# Patient Record
Sex: MALE | Race: ASIAN | NOT HISPANIC OR LATINO | Employment: FULL TIME | URBAN - METROPOLITAN AREA
[De-identification: names, ages, dates, MRNs, and addresses within clinical notes are randomized per-mention and may not be internally consistent; named-entity substitution may affect disease eponyms.]

---

## 2018-02-05 ENCOUNTER — OFFICE VISIT (OUTPATIENT)
Dept: FAMILY MEDICINE CLINIC | Facility: CLINIC | Age: 38
End: 2018-02-05
Payer: COMMERCIAL

## 2018-02-05 VITALS
OXYGEN SATURATION: 98 % | HEART RATE: 75 BPM | DIASTOLIC BLOOD PRESSURE: 68 MMHG | RESPIRATION RATE: 18 BRPM | SYSTOLIC BLOOD PRESSURE: 112 MMHG | TEMPERATURE: 98.3 F | WEIGHT: 212 LBS

## 2018-02-05 DIAGNOSIS — L72.3 SEBACEOUS CYST: ICD-10-CM

## 2018-02-05 DIAGNOSIS — Z13.220 SCREENING FOR HYPERLIPIDEMIA: ICD-10-CM

## 2018-02-05 DIAGNOSIS — Z00.00 ROUTINE ADULT HEALTH MAINTENANCE: Primary | ICD-10-CM

## 2018-02-05 PROCEDURE — 99385 PREV VISIT NEW AGE 18-39: CPT | Performed by: STUDENT IN AN ORGANIZED HEALTH CARE EDUCATION/TRAINING PROGRAM

## 2018-02-05 RX ORDER — SULFAMETHOXAZOLE AND TRIMETHOPRIM 800; 160 MG/1; MG/1
TABLET ORAL
COMMUNITY
Start: 2018-01-30 | End: 2018-02-06

## 2018-02-05 RX ORDER — SULFAMETHOXAZOLE AND TRIMETHOPRIM 800; 160 MG/1; MG/1
TABLET ORAL
Refills: 0 | COMMUNITY
Start: 2018-01-30

## 2018-02-05 NOTE — ASSESSMENT & PLAN NOTE
Continue Warm Compress  Complete course of Abx - Bactrim DS  Continue to follow-up as outpatient with Dr Maye Marquez regarding Excision

## 2018-02-05 NOTE — PROGRESS NOTES
Assessment/Plan:    Sebaceous cyst  Continue Warm Compress  Complete course of Abx - Bactrim DS  Continue to follow-up as outpatient with Dr Jen Lee regarding Excision    Screening for hyperlipidemia  Obtain Lipid Panel    Routine adult health maintenance  Obtain Basic Metabolic Panel      Diagnoses and all orders for this visit:  #1: Routine adult health maintenance  #2: Screening for hyperlipidemia  #3: Sebaceous cyst    Other Orders:  Sulfamethoxazole-trimethoprim (BACTRIM DS) 800-160 mg per tablet; take 2 tablets by mouth twice a day  FOR 7 DAYS      Subjective:      Patient ID: Misael Burkett is a 40 y o  male  HPI  40year old male presents to clinic to establish care  No significant PMH  Notes a sebaceous cyst on his back  States that it has been ongoing since 2014, but has not been a problem  Over the last 2 weeks, patient states that cyst has gotten progressively worse  Report cyst on back to be the size of a bo  Reports going to an urgent care last Tuesday and was prescribed a course of Bactrim and advised to complete 7 days of therapy  Also recommended to see a general surgeon regarding possible removal  Patient followed up with Dr Gabriel Farrell  Was insructed to finish the course of Abx and follow up in order to determine a date for excision if symptoms not improving  Has no other complaints today  The following portions of the patient's history were reviewed and updated as appropriate: allergies, current medications, past family history, past medical history, past social history, past surgical history and problem list     Review of Systems   Constitutional: Negative for chills, diaphoresis, fatigue and fever  Respiratory: Negative for cough, chest tightness, shortness of breath and wheezing  Cardiovascular: Negative for chest pain, palpitations and leg swelling  Gastrointestinal: Negative for abdominal pain, constipation, diarrhea, nausea and vomiting     Genitourinary: Negative for decreased urine volume, difficulty urinating, flank pain and hematuria  Musculoskeletal: Negative for arthralgias, back pain and myalgias  Skin:        Sebaceous Cyst   Neurological: Negative for dizziness, syncope, weakness, numbness and headaches  Objective:     Physical Exam   Constitutional: He appears well-developed and well-nourished  No distress  Cardiovascular: Normal rate, normal heart sounds and intact distal pulses  Exam reveals no gallop and no friction rub  No murmur heard  Pulmonary/Chest: Effort normal    Abdominal: Soft  Bowel sounds are normal  He exhibits no distension  There is no tenderness  Skin: He is not diaphoretic     4x4, Erythematous, Non-Tender, Draining, Midthoracic Sebaceous Cyst

## 2018-03-11 LAB
BUN SERPL-MCNC: 16 MG/DL (ref 7–25)
BUN/CREAT SERPL: ABNORMAL (CALC) (ref 6–22)
CALCIUM SERPL-MCNC: 9.5 MG/DL (ref 8.6–10.3)
CHLORIDE SERPL-SCNC: 103 MMOL/L (ref 98–110)
CHOLEST SERPL-MCNC: 180 MG/DL
CHOLEST/HDLC SERPL: 6.4 (CALC)
CO2 SERPL-SCNC: 29 MMOL/L (ref 20–31)
CREAT SERPL-MCNC: 1 MG/DL (ref 0.6–1.35)
GLUCOSE SERPL-MCNC: 104 MG/DL (ref 65–99)
HDLC SERPL-MCNC: 28 MG/DL
LDLC SERPL CALC-MCNC: 129 MG/DL (CALC)
NONHDLC SERPL-MCNC: 152 MG/DL (CALC)
POTASSIUM SERPL-SCNC: 4.7 MMOL/L (ref 3.5–5.3)
SL AMB EGFR AFRICAN AMERICAN: 112 ML/MIN/1.73M2
SL AMB EGFR NON AFRICAN AMERICAN: 96 ML/MIN/1.73M2
SODIUM SERPL-SCNC: 138 MMOL/L (ref 135–146)
TRIGL SERPL-MCNC: 119 MG/DL

## 2018-07-18 ENCOUNTER — TELEPHONE (OUTPATIENT)
Dept: FAMILY MEDICINE CLINIC | Facility: CLINIC | Age: 38
End: 2018-07-18

## 2018-07-18 NOTE — TELEPHONE ENCOUNTER
Pt's wife calling regarding blood work done in march  She is very upset that no one has called them back with results    Wants to know what to do about the abnormal cholesterol

## 2018-07-25 ENCOUNTER — TELEPHONE (OUTPATIENT)
Dept: FAMILY MEDICINE CLINIC | Facility: CLINIC | Age: 38
End: 2018-07-25

## 2019-06-24 ENCOUNTER — TELEPHONE (OUTPATIENT)
Dept: FAMILY MEDICINE CLINIC | Facility: CLINIC | Age: 39
End: 2019-06-24

## 2021-09-28 ENCOUNTER — TELEPHONE (OUTPATIENT)
Dept: FAMILY MEDICINE CLINIC | Facility: CLINIC | Age: 41
End: 2021-09-28